# Patient Record
(demographics unavailable — no encounter records)

---

## 2025-03-26 NOTE — BEGINNING OF VISIT
[Mother] : mother Addendum-pt did not want to wait for [employer]; requested private pay cab.  SW arranged [place]'s taxi.

## 2025-03-27 NOTE — DISCUSSION/SUMMARY
[School] : school [Development and Mental Health] : development and mental health [Nutrition and Physical Activity] : nutrition and physical activity [Oral Health] : oral health [Safety] : safety [de-identified] : pending cardio followup for new murmur [FreeTextEntry1] : Continue balanced diet with all food groups. Brush teeth twice a day with toothbrush. Recommend visit to dentist. Maintain consistent daily routines and sleep schedule. School discussed. Limit screen time to no more than 2 hours per day. Encourage physical activity. Tdap given Check routine labs Return 1 year for routine well child check.  CARDIO - new murmur appreciated. asymptomatic. no recent illness. cardio referral provided OPHTHALMOLOGY - failed vision screening, follow up optometrist

## 2025-03-27 NOTE — HISTORY OF PRESENT ILLNESS
[Eats healthy meals and snacks] : eats healthy meals and snacks [Eats meals with family] : eats meals with family [Normal] : Normal [In own bed] : In own bed [Brushing teeth twice/d] : brushing teeth twice per day [Yes] : Patient goes to dentist yearly [Toothpaste] : Primary Fluoride Source: Toothpaste [Premenarche] : premenarche [Playtime (60 min/d)] : playtime 60 min a day [Participates in after-school activities] : participates in after-school activities [Appropiate parent-child-sibling interaction] : appropriate parent-child-sibling interaction [Has Friends] : has friends [Has chance to make own decisions] : has chance to make own decisions [Grade ___] : Grade [unfilled] [Adequate social interactions] : adequate social interactions [Adequate behavior] : adequate behavior [Adequate performance] : adequate performance [Adequate attention] : adequate attention [No difficulties with Homework] : no difficulties with homework [No] : No cigarette smoke exposure [Up to date] : Up to date [FreeTextEntry1] : WELL VISIT 10 YEAR OLD

## 2025-03-27 NOTE — DISCUSSION/SUMMARY
[School] : school [Development and Mental Health] : development and mental health [Nutrition and Physical Activity] : nutrition and physical activity [Oral Health] : oral health [Safety] : safety [de-identified] : pending cardio followup for new murmur [FreeTextEntry1] : Continue balanced diet with all food groups. Brush teeth twice a day with toothbrush. Recommend visit to dentist. Maintain consistent daily routines and sleep schedule. School discussed. Limit screen time to no more than 2 hours per day. Encourage physical activity. Tdap given Check routine labs Return 1 year for routine well child check.  CARDIO - new murmur appreciated. asymptomatic. no recent illness. cardio referral provided OPHTHALMOLOGY - failed vision screening, follow up optometrist

## 2025-03-27 NOTE — PHYSICAL EXAM
[Alert] : alert [No Acute Distress] : no acute distress [Normocephalic] : normocephalic [Conjunctivae with no discharge] : conjunctivae with no discharge [PERRL] : PERRL [EOMI Bilateral] : EOMI bilateral [Auricles Well Formed] : auricles well formed [Clear Tympanic membranes with present light reflex and bony landmarks] : clear tympanic membranes with present light reflex and bony landmarks [No Discharge] : no discharge [Nares Patent] : nares patent [Pink Nasal Mucosa] : pink nasal mucosa [Palate Intact] : palate intact [Nonerythematous Oropharynx] : nonerythematous oropharynx [Supple, full passive range of motion] : supple, full passive range of motion [No Palpable Masses] : no palpable masses [Symmetric Chest Rise] : symmetric chest rise [Clear to Auscultation Bilaterally] : clear to auscultation bilaterally [Regular Rate and Rhythm] : regular rate and rhythm [Normal S1, S2 present] : normal S1, S2 present [+2 Femoral Pulses] : +2 femoral pulses [Soft] : soft [NonTender] : non tender [Non Distended] : non distended [Normoactive Bowel Sounds] : normoactive bowel sounds [No Hepatomegaly] : no hepatomegaly [No Splenomegaly] : no splenomegaly [Patent] : patent [No fissures] : no fissures [No Abnormal Lymph Nodes Palpated] : no abnormal lymph nodes palpated [No Gait Asymmetry] : no gait asymmetry [No pain or deformities with palpation of bone, muscles, joints] : no pain or deformities with palpation of bone, muscles, joints [Normal Muscle Tone] : normal muscle tone [Straight] : straight [+2 Patella DTR] : +2 patella DTR [Cranial Nerves Grossly Intact] : cranial nerves grossly intact [No Rash or Lesions] : no rash or lesions [FreeTextEntry8] : +2/6 systolic murmur

## 2025-04-01 NOTE — HISTORY OF PRESENT ILLNESS
[FreeTextEntry1] : CHERI is a 10-year-old female who was referred for cardiology consultation due to a heart murmur. The murmur was first diagnosed during a routine pediatric visit a few weeks ago. It was described by the pediatrician as soft. The patient was not ill or febrile at the time of that visit. She has been doing well with no symptoms referrable to cardiovascular system. There has been no chest pain, palpitations, diaphoresis, shortness of breath or syncope. There has been no recent change in activity level, no fatigue, and no difficulty gaining weight or weight loss. She is active in gym and swimming and has had no recent decrease in athletic endurance.

## 2025-04-01 NOTE — REASON FOR VISIT
[Initial Consultation] : an initial consultation for [Murmurs] : a murmur [Mother] : mother [Initial Consultation] : an initial consultation for [Chest Pain] : chest pain [Father] : father

## 2025-04-01 NOTE — CARDIOLOGY SUMMARY
[Today's Date] : [unfilled] [FreeTextEntry1] : EKG shows normal sinus rhythm at the rate of 87 bpm with normal axis, no chamber enlargement and normal intervals.

## 2025-04-01 NOTE — CONSULT LETTER
[Today's Date] : [unfilled] [Name] : Name: [unfilled] [] : : ~~ [Today's Date:] : [unfilled] [Dear  ___:] : Dear Dr. [unfilled]: [Consult - Single Provider] : Thank you very much for allowing me to participate in the care of this patient. If you have any questions, please do not hesitate to contact me. [Sincerely,] : Sincerely, [FreeTextEntry4] : Clark Forbes MD [FreeTextEntry5] : [address], [place] [address] [FreeTextEntry6] : [phone] [de-identified] : Christopher Manrique MD, FACC Attending, Pediatric Cardiology Non-Invasive Imaging and Fetal Cardiology  of Pediatrics [facility] [phone] [place] [address], [address] [place] [address] Office: [phone] Fax: [phone]

## 2025-04-01 NOTE — DISCUSSION/SUMMARY
[FreeTextEntry1] : In summary, CHERI is a 10-year-old female with a normal cardiac examination today. The history, physical exam and EKG are reassuring. I discussed at length with the family that the murmur heard by pediatrician is not related to cardiac pathology and may get louder during times of illness or fever.  No physical activity restrictions are needed from a cardiac perspective.  The family verbalized understanding, and all questions were answered. No further cardiology follow-up is required, unless there are cardiac concerns. [Needs SBE Prophylaxis] : [unfilled] does not need bacterial endocarditis prophylaxis [PE + No Restrictions] : [unfilled] may participate in the entire physical education program without restriction, including all varsity competitive sports.